# Patient Record
Sex: MALE | Race: WHITE | NOT HISPANIC OR LATINO | URBAN - METROPOLITAN AREA
[De-identification: names, ages, dates, MRNs, and addresses within clinical notes are randomized per-mention and may not be internally consistent; named-entity substitution may affect disease eponyms.]

---

## 2017-02-23 ENCOUNTER — OUTPATIENT (OUTPATIENT)
Dept: OUTPATIENT SERVICES | Facility: HOSPITAL | Age: 67
LOS: 1 days | End: 2017-02-23

## 2017-03-08 ENCOUNTER — OUTPATIENT (OUTPATIENT)
Dept: OUTPATIENT SERVICES | Facility: HOSPITAL | Age: 67
LOS: 1 days | End: 2017-03-08

## 2019-04-08 ENCOUNTER — OUTPATIENT (OUTPATIENT)
Dept: OUTPATIENT SERVICES | Facility: HOSPITAL | Age: 69
LOS: 1 days | End: 2019-04-08

## 2019-05-31 ENCOUNTER — OUTPATIENT (OUTPATIENT)
Dept: OUTPATIENT SERVICES | Facility: HOSPITAL | Age: 69
LOS: 1 days | End: 2019-05-31

## 2019-11-19 ENCOUNTER — OUTPATIENT (OUTPATIENT)
Dept: OUTPATIENT SERVICES | Facility: HOSPITAL | Age: 69
LOS: 1 days | End: 2019-11-19

## 2020-11-20 ENCOUNTER — OUTPATIENT (OUTPATIENT)
Dept: OUTPATIENT SERVICES | Facility: HOSPITAL | Age: 70
LOS: 1 days | End: 2020-11-20

## 2021-03-08 ENCOUNTER — OUTPATIENT (OUTPATIENT)
Dept: OUTPATIENT SERVICES | Facility: HOSPITAL | Age: 71
LOS: 1 days | End: 2021-03-08

## 2022-03-08 ENCOUNTER — OUTPATIENT (OUTPATIENT)
Dept: OUTPATIENT SERVICES | Facility: HOSPITAL | Age: 72
LOS: 1 days | End: 2022-03-08
Payer: MEDICARE

## 2022-03-08 DIAGNOSIS — R10.9 UNSPECIFIED ABDOMINAL PAIN: ICD-10-CM

## 2022-03-08 DIAGNOSIS — E04.1 NONTOXIC SINGLE THYROID NODULE: ICD-10-CM

## 2022-03-08 PROCEDURE — 76700 US EXAM ABDOM COMPLETE: CPT | Mod: 26

## 2022-03-08 PROCEDURE — 76536 US EXAM OF HEAD AND NECK: CPT | Mod: 26

## 2022-03-16 ENCOUNTER — APPOINTMENT (OUTPATIENT)
Dept: ENDOCRINOLOGY | Facility: CLINIC | Age: 72
End: 2022-03-16
Payer: MEDICARE

## 2022-03-16 ENCOUNTER — RESULT REVIEW (OUTPATIENT)
Age: 72
End: 2022-03-16

## 2022-03-16 VITALS
DIASTOLIC BLOOD PRESSURE: 68 MMHG | HEART RATE: 104 BPM | WEIGHT: 149 LBS | HEIGHT: 70 IN | SYSTOLIC BLOOD PRESSURE: 112 MMHG | BODY MASS INDEX: 21.33 KG/M2

## 2022-03-16 DIAGNOSIS — R00.0 TACHYCARDIA, UNSPECIFIED: ICD-10-CM

## 2022-03-16 DIAGNOSIS — Z83.49 FAMILY HISTORY OF OTHER ENDOCRINE, NUTRITIONAL AND METABOLIC DISEASES: ICD-10-CM

## 2022-03-16 DIAGNOSIS — E05.90 THYROTOXICOSIS, UNSPECIFIED W/OUT THYROTOXIC CRISIS OR STORM: ICD-10-CM

## 2022-03-16 PROCEDURE — 99204 OFFICE O/P NEW MOD 45 MIN: CPT

## 2022-03-16 RX ORDER — METOPROLOL SUCCINATE 25 MG/1
25 TABLET, EXTENDED RELEASE ORAL
Refills: 0 | Status: ACTIVE | COMMUNITY

## 2022-03-16 RX ORDER — FLUTICASONE PROPIONATE 50 UG/1
50 SPRAY, METERED NASAL
Refills: 0 | Status: ACTIVE | COMMUNITY

## 2022-03-16 NOTE — ASSESSMENT
[FreeTextEntry1] : 72 y.o. male Hx of A-fib (Dx 3/2021) on AC with Eliquis and rate controlled with BB, referred from PCP for evaluation of abnormal TFTs, (TSH 0.007, FT4 1.94). Patient c/o recent wt loss 5 Lb and palpitations. Thyroid US significant for:\par Right lobe:\par 1.3 x 1.7 x 1.1 cm - Mid/Low pole Hyperechoic nodule\par Left lobe\par 2.1 x 1.5 x 1.4 cm Mid pole HYperechoic nodule \par 2.0 x 1.1 x 1.3 cm  Lateral Mid Hyperechoic nodule\par \par # Hyperthyroidism in the settings of wt loss and underlying paroxysmal a-fib\par DD include Grave's disease vs Toxic MNG vs. Toxic thyroid nodule\par Unlikely thyroiditis\par Will obtain CMP with CBC prior to initiate Tx with Methimazole - risks, side effects and benefits of the medication were explained to the patient \par TSI and TRAB ordered\par \par # MNG\par Will obtain thyroid uptake scan to r/o functioning nodules.\par If cold nodules will consider FNA\par \par # Mild tachycardia in the settings of paroxysmal a-fib\par Likely secondary to hyperthyroidism\par Should improve with initiation of Methimazole \par Continue current regimen with Metoprolol 25 mg XR daily\par Patient will discuss dose adjustment with his cardiologist. \par \par Will call the patient with lab results and prescription of Methimazole\par F/u in clinic in 4 weeks with repeated blood work prior the visit. \par

## 2022-03-16 NOTE — HISTORY OF PRESENT ILLNESS
[FreeTextEntry1] : 72 y.o. male Hx of A-fib (Dx 3/2021) on AC with Eliquis and rate controlled with BB, referred from PCP for evaluation of abnormal TFTs, (TSH 0.007, FT4 1.94). Patient denies previous Hx of thyroid disease or taking thyroid medications. No Hx of  IV contrast enhanced images or taking Amiodarone. He is generally very active physically but for the last few months has been experiencing more palpitations and lost about 5Lb. Denies compressive symptoms, neck pain, sore throat or dysphagia. Denies eye pain, soreness or pressure. Family Hx significant for 3 sisters with hypothyroidism.

## 2022-03-16 NOTE — REVIEW OF SYSTEMS
[Recent Weight Loss (___ Lbs)] : recent weight loss: [unfilled] lbs [Palpitations] : palpitations [As Noted in HPI] : as noted in HPI [Negative] : Heme/Lymph

## 2022-03-17 ENCOUNTER — EMERGENCY (EMERGENCY)
Facility: HOSPITAL | Age: 72
LOS: 1 days | Discharge: HOME | End: 2022-03-17
Admitting: EMERGENCY MEDICINE
Payer: MEDICARE

## 2022-03-17 PROCEDURE — 93010 ELECTROCARDIOGRAM REPORT: CPT

## 2022-03-17 PROCEDURE — 99284 EMERGENCY DEPT VISIT MOD MDM: CPT

## 2022-03-18 DIAGNOSIS — Z79.01 LONG TERM (CURRENT) USE OF ANTICOAGULANTS: ICD-10-CM

## 2022-03-18 DIAGNOSIS — I10 ESSENTIAL (PRIMARY) HYPERTENSION: ICD-10-CM

## 2022-03-18 DIAGNOSIS — R55 SYNCOPE AND COLLAPSE: ICD-10-CM

## 2022-03-18 DIAGNOSIS — I48.92 UNSPECIFIED ATRIAL FLUTTER: ICD-10-CM

## 2022-03-24 ENCOUNTER — OUTPATIENT (OUTPATIENT)
Dept: OUTPATIENT SERVICES | Facility: HOSPITAL | Age: 72
LOS: 1 days | End: 2022-03-24
Payer: MEDICARE

## 2022-03-24 DIAGNOSIS — E04.9 NONTOXIC GOITER, UNSPECIFIED: ICD-10-CM

## 2022-03-25 PROCEDURE — 78014 THYROID IMAGING W/BLOOD FLOW: CPT | Mod: 26

## 2022-03-29 ENCOUNTER — NON-APPOINTMENT (OUTPATIENT)
Age: 72
End: 2022-03-29

## 2022-04-13 ENCOUNTER — APPOINTMENT (OUTPATIENT)
Dept: ENDOCRINOLOGY | Facility: CLINIC | Age: 72
End: 2022-04-13

## 2022-07-11 ENCOUNTER — EMERGENCY (EMERGENCY)
Facility: HOSPITAL | Age: 72
LOS: 1 days | Discharge: HOME | End: 2022-07-11
Admitting: EMERGENCY MEDICINE
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
## 2022-07-11 DIAGNOSIS — S60.351A SUPERFICIAL FOREIGN BODY OF RIGHT THUMB, INITIAL ENCOUNTER: ICD-10-CM
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
## 2022-07-11 DIAGNOSIS — Z79.01 LONG TERM (CURRENT) USE OF ANTICOAGULANTS: ICD-10-CM
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
## 2022-07-11 DIAGNOSIS — I10 ESSENTIAL (PRIMARY) HYPERTENSION: ICD-10-CM

## 2022-07-11 PROCEDURE — 99283 EMERGENCY DEPT VISIT LOW MDM: CPT | Mod: FS

## 2022-09-29 ENCOUNTER — NON-APPOINTMENT (OUTPATIENT)
Age: 72
End: 2022-09-29

## 2022-10-17 ENCOUNTER — NON-APPOINTMENT (OUTPATIENT)
Age: 72
End: 2022-10-17

## 2022-10-17 DIAGNOSIS — J32.9 CHRONIC SINUSITIS, UNSPECIFIED: ICD-10-CM

## 2022-10-17 DIAGNOSIS — I48.92 UNSPECIFIED ATRIAL FLUTTER: ICD-10-CM

## 2022-10-17 DIAGNOSIS — Z83.3 FAMILY HISTORY OF DIABETES MELLITUS: ICD-10-CM

## 2022-10-17 DIAGNOSIS — R51.9 HEADACHE, UNSPECIFIED: ICD-10-CM

## 2022-10-17 RX ORDER — PNV NO.95/FERROUS FUM/FOLIC AC 28MG-0.8MG
TABLET ORAL
Refills: 0 | Status: ACTIVE | COMMUNITY

## 2023-01-05 ENCOUNTER — RX ONLY (RX ONLY)
Age: 73
End: 2023-01-05

## 2023-01-05 ENCOUNTER — OFFICE (OUTPATIENT)
Dept: URBAN - METROPOLITAN AREA CLINIC 38 | Facility: CLINIC | Age: 73
Setting detail: OPHTHALMOLOGY
End: 2023-01-05
Payer: MEDICARE

## 2023-01-05 DIAGNOSIS — H02.403: ICD-10-CM

## 2023-01-05 DIAGNOSIS — Z96.1: ICD-10-CM

## 2023-01-05 DIAGNOSIS — H02.015: ICD-10-CM

## 2023-01-05 DIAGNOSIS — H02.012: ICD-10-CM

## 2023-01-05 PROCEDURE — 92014 COMPRE OPH EXAM EST PT 1/>: CPT | Performed by: OPHTHALMOLOGY

## 2023-01-05 ASSESSMENT — REFRACTION_CURRENTRX
OD_AXIS: 121
OS_SPHERE: +0.25
OD_OVR_VA: 20/
OD_AXIS: 128
OS_CYLINDER: -3.00
OD_VPRISM_DIRECTION: SV
OD_VPRISM_DIRECTION: SV
OD_OVR_VA: 20/
OS_SPHERE: +3.00
OD_CYLINDER: -3.00
OS_AXIS: 078
OD_SPHERE: +4.25
OS_OVR_VA: 20/
OS_OVR_VA: 20/
OS_CYLINDER: -3.00
OS_AXIS: 078
OD_CYLINDER: -3.00
OS_VPRISM_DIRECTION: SV
OS_VPRISM_DIRECTION: SV
OD_SPHERE: +1.75

## 2023-01-05 ASSESSMENT — REFRACTION_MANIFEST
OS_VPRISM: BD
OS_AXIS: 70
OS_ADD: +2.75
OD_VA2: 20/20(J1+)
OD_SPHERE: +1.75
OU_VA: 20/20-1
OD_ADD: +2.75
OS_VA1: 20/30
OD_VPRISM: BU
OD_AXIS: 120
OD_VA1: 20/20-2
OD_CYLINDER: -3.25
OS_SPHERE: +0.25
OS_VA2: 20/20(J1+)
OS_CYLINDER: -3.00

## 2023-01-05 ASSESSMENT — VISUAL ACUITY
OS_BCVA: 20/20-1
OD_BCVA: 20/25

## 2023-01-05 ASSESSMENT — KERATOMETRY
OD_AXISANGLE_DEGREES: 39
OD_K2POWER_DIOPTERS: 45.75
OD_K1POWER_DIOPTERS: 43.75
OS_K2POWER_DIOPTERS: 46.00
METHOD_AUTO_MANUAL: AUTO
OS_AXISANGLE_DEGREES: 152
OS_K1POWER_DIOPTERS: 44.00

## 2023-01-05 ASSESSMENT — SPHEQUIV_DERIVED
OD_SPHEQUIV: 0
OS_SPHEQUIV: -1.25
OD_SPHEQUIV: 0.125

## 2023-01-05 ASSESSMENT — LID POSITION - COMMENTS
OD_COMMENTS: 1MM
OS_COMMENTS: 1MM

## 2023-01-05 ASSESSMENT — AXIALLENGTH_DERIVED
OS_AL: 23.5281
OD_AL: 23.1415
OD_AL: 23.0948

## 2023-01-05 ASSESSMENT — REFRACTION_AUTOREFRACTION
OS_AXIS: 69
OD_AXIS: 123
OD_CYLINDER: -3.50
OD_SPHERE: +1.75
OS_CYLINDER: -3.00
OS_SPHERE: PLANO

## 2023-01-05 ASSESSMENT — LID POSITION - PTOSIS
OS_PTOSIS: LUL
OD_PTOSIS: RUL

## 2023-01-05 ASSESSMENT — CONFRONTATIONAL VISUAL FIELD TEST (CVF)
OD_FINDINGS: FULL
OS_FINDINGS: FULL

## 2023-01-05 ASSESSMENT — TONOMETRY
OD_IOP_MMHG: 14
OS_IOP_MMHG: 14

## 2023-01-24 ENCOUNTER — RESULT REVIEW (OUTPATIENT)
Age: 73
End: 2023-01-24

## 2023-02-13 ENCOUNTER — APPOINTMENT (OUTPATIENT)
Dept: ENDOCRINOLOGY | Facility: CLINIC | Age: 73
End: 2023-02-13
Payer: MEDICARE

## 2023-02-13 VITALS
TEMPERATURE: 98.2 F | BODY MASS INDEX: 22.19 KG/M2 | DIASTOLIC BLOOD PRESSURE: 80 MMHG | HEIGHT: 70 IN | WEIGHT: 155 LBS | SYSTOLIC BLOOD PRESSURE: 122 MMHG | OXYGEN SATURATION: 99 % | HEART RATE: 85 BPM

## 2023-02-13 PROCEDURE — 99213 OFFICE O/P EST LOW 20 MIN: CPT

## 2023-02-13 RX ORDER — AMITRIPTYLINE HYDROCHLORIDE 75 MG/1
TABLET, FILM COATED ORAL
Refills: 0 | Status: DISCONTINUED | COMMUNITY
End: 2023-02-13

## 2023-02-13 NOTE — HISTORY OF PRESENT ILLNESS
[FreeTextEntry1] : This is a 73-year-old white male with a past medical history of a multinodular goiter high thyroidism secondary to acute thyroiditis after using amiodarone.  He also he has has a history of cardiac arrhythmias.  Recently he claimed that he had an episode of a palpitation for which she required radiofrequency ablation and implantation of a cardiac monitor.  He claims that his appetite is good and he has gained weight.  He denies neck pain difficulty swallowing or hoarseness.  He has not noticed any excessive heat intolerance nausea vomiting or diarrhea.  His energy level is well-preserved.  Current medications include atorvastatin, Eliquis, metoprolol, Flonase,.

## 2023-02-13 NOTE — PHYSICAL EXAM
[Alert] : alert [Well Nourished] : well nourished [No Acute Distress] : no acute distress [Well Developed] : well developed [Normal Sclera/Conjunctiva] : normal sclera/conjunctiva [EOMI] : extra ocular movement intact [No Proptosis] : no proptosis [Normal Oropharynx] : the oropharynx was normal [No Neck Mass] : no neck mass was observed [Thyroid Not Enlarged] : the thyroid was not enlarged [No Thyroid Nodules] : no palpable thyroid nodules [No Respiratory Distress] : no respiratory distress [No Accessory Muscle Use] : no accessory muscle use [Clear to Auscultation] : lungs were clear to auscultation bilaterally [Normal S1, S2] : normal S1 and S2 [Normal Rate] : heart rate was normal [Regular Rhythm] : with a regular rhythm [No Edema] : no peripheral edema [Pedal Pulses Normal] : the pedal pulses are present [Normal Bowel Sounds] : normal bowel sounds [Not Tender] : non-tender [Not Distended] : not distended [Soft] : abdomen soft [Normal Anterior Cervical Nodes] : no anterior cervical lymphadenopathy [Normal Posterior Cervical Nodes] : no posterior cervical lymphadenopathy [No Spinal Tenderness] : no spinal tenderness [Spine Straight] : spine straight [No Stigmata of Cushings Syndrome] : no stigmata of Cushings Syndrome [Normal Gait] : normal gait [Normal Strength/Tone] : muscle strength and tone were normal [No Rash] : no rash [Acanthosis Nigricans] : no acanthosis nigricans [Normal Reflexes] : deep tendon reflexes were 2+ and symmetric [No Tremors] : no tremors [Oriented x3] : oriented to person, place, and time

## 2023-02-13 NOTE — ASSESSMENT
[FreeTextEntry1] : Middle-aged white male who has a past medical history of a multinodular goiter.  Currently stable.  His last TSH level is 1.92.  He recently had a sonogram done in the hospital which showed that there was a new nodule on the left isthmus area measuring 3 x 6 x 3 mm and it falls in the category of TR 3.  The remaining 2 nodules on the left lower pole are unchanged.  Clinical impression is that this is a nontoxic multinodular goiter with most likely benign nodules which have remained stable and unchanged.  Patient currently does not require any thyroid medication.  Recommendation\par 1.  I have advised the patient to repeat a blood test for the thyroid functions in approximately 6 months time.\par 2.  If the blood tests are normal then he would not require any intervention but we will repeat a sonogram of the thyroid in 1 years time.  The plan was discussed in detail with the patient thank you

## 2023-02-22 ENCOUNTER — APPOINTMENT (OUTPATIENT)
Dept: ULTRASOUND IMAGING | Facility: CLINIC | Age: 73
End: 2023-02-22
Payer: MEDICARE

## 2023-02-22 PROCEDURE — 93880 EXTRACRANIAL BILAT STUDY: CPT

## 2023-09-01 ENCOUNTER — APPOINTMENT (OUTPATIENT)
Dept: ENDOCRINOLOGY | Facility: CLINIC | Age: 73
End: 2023-09-01
Payer: MEDICARE

## 2023-09-01 VITALS
DIASTOLIC BLOOD PRESSURE: 70 MMHG | HEIGHT: 70 IN | BODY MASS INDEX: 22.19 KG/M2 | TEMPERATURE: 98 F | WEIGHT: 155 LBS | OXYGEN SATURATION: 98 % | RESPIRATION RATE: 14 BRPM | HEART RATE: 88 BPM | SYSTOLIC BLOOD PRESSURE: 108 MMHG

## 2023-09-01 PROCEDURE — 99213 OFFICE O/P EST LOW 20 MIN: CPT

## 2023-09-01 NOTE — REASON FOR VISIT
[Follow - Up] : a follow-up visit [Hyperthyroidism] : hyperthyroidism [Thyroid nodule/ MNG] : thyroid nodule/ MNG Rinvoq Counseling: I discussed with the patient the risks of Rinvoq therapy including but not limited to upper respiratory tract infections, shingles, cold sores, bronchitis, nausea, cough, fever, acne, and headache. Live vaccines should be avoided.  This medication has been linked to serious infections; higher rate of mortality; malignancy and lymphoproliferative disorders; major adverse cardiovascular events; thrombosis; thrombocytopenia, anemia, and neutropenia; lipid elevations; liver enzyme elevations; and gastrointestinal perforations.

## 2023-09-01 NOTE — HISTORY OF PRESENT ILLNESS
[FreeTextEntry1] : This is a pleasant 70-year-old white male with a past medical history of a multinodular goiter, history of acute thyroiditis after he was treated with amiodarone.  Patient currently is not on any thyroid medications.  He also has a history of a cardiac arrhythmias.  He recently had ablation done to the heart area for his cardiac arrhythmia with success.  He claims that his appetite is good and his weight has been stable he denies any palpitations chest pain difficulty swallowing or hoarseness.  His current medications also include atorvastatin, Eliquis, metoprolol, and Flonase.  Review of systems is otherwise negative.  He has not noticed any heat or cold intolerance or change in his bowel movements

## 2023-09-01 NOTE — ASSESSMENT
[FreeTextEntry1] : Middle-aged white gentleman who has a history of acute thyroiditis and was found to have a mild multinodular goiter.  His last sonogram which was performed earlier this year had not shown any changes except for a new left isthmus nodule measuring approximately 6 mm in size and was classified as TR 4.  Patient clinically is euthyroid.  He recently had a blood work done on August 22 which showed that the TSH is 2.45 and the free T4 is 1.0.  The above findings were discussed in detail with the patient.  Recommendation 1.  Patient at the present time is stable and does not require any intervention. 2.  We will repeat a sonogram of the thyroid in January of next year with a repeat blood test after which he will follow-up in the office. The plan was discussed in detail with the patient and all his questions were answered.  Thank you

## 2023-09-01 NOTE — PHYSICAL EXAM
[Alert] : alert [Well Nourished] : well nourished [No Acute Distress] : no acute distress [Well Developed] : well developed [Normal Sclera/Conjunctiva] : normal sclera/conjunctiva [EOMI] : extra ocular movement intact [No Proptosis] : no proptosis [Normal Oropharynx] : the oropharynx was normal [Thyroid Not Enlarged] : the thyroid was not enlarged [No Thyroid Nodules] : no palpable thyroid nodules [No Respiratory Distress] : no respiratory distress [No Accessory Muscle Use] : no accessory muscle use [Clear to Auscultation] : lungs were clear to auscultation bilaterally [Normal S1, S2] : normal S1 and S2 [Normal Rate] : heart rate was normal [Regular Rhythm] : with a regular rhythm [No Edema] : no peripheral edema [Pedal Pulses Normal] : the pedal pulses are present [Normal Bowel Sounds] : normal bowel sounds [Not Tender] : non-tender [Not Distended] : not distended [Soft] : abdomen soft [Normal Anterior Cervical Nodes] : no anterior cervical lymphadenopathy [Normal Posterior Cervical Nodes] : no posterior cervical lymphadenopathy [No Spinal Tenderness] : no spinal tenderness [Spine Straight] : spine straight [No Stigmata of Cushings Syndrome] : no stigmata of Cushings Syndrome [Normal Gait] : normal gait [Normal Strength/Tone] : muscle strength and tone were normal [No Rash] : no rash [Acanthosis Nigricans] : no acanthosis nigricans [Normal Reflexes] : deep tendon reflexes were 2+ and symmetric [No Tremors] : no tremors [Oriented x3] : oriented to person, place, and time [de-identified] : Normal size of the thyroid with a slightly irregular surface with the possibility of a small nodule on the left lower pole

## 2024-01-11 ENCOUNTER — OFFICE (OUTPATIENT)
Dept: URBAN - METROPOLITAN AREA CLINIC 38 | Facility: CLINIC | Age: 74
Setting detail: OPHTHALMOLOGY
End: 2024-01-11
Payer: MEDICARE

## 2024-01-11 DIAGNOSIS — H02.403: ICD-10-CM

## 2024-01-11 DIAGNOSIS — Z96.1: ICD-10-CM

## 2024-01-11 PROCEDURE — 92014 COMPRE OPH EXAM EST PT 1/>: CPT | Performed by: OPHTHALMOLOGY

## 2024-01-11 ASSESSMENT — REFRACTION_CURRENTRX
OS_VPRISM_DIRECTION: SV
OD_AXIS: 120
OD_OVR_VA: 20/
OD_CYLINDER: -3.00
OS_AXIS: 074
OD_CYLINDER: -3.00
OS_AXIS: 073
OS_OVR_VA: 20/
OD_AXIS: 110
OS_OVR_VA: 20/
OD_VPRISM_DIRECTION: SV
OS_CYLINDER: -3.25
OS_VPRISM_DIRECTION: SV
OS_CYLINDER: -3.00
OD_SPHERE: +1.75
OD_SPHERE: +4.25
OD_VPRISM_DIRECTION: SV
OS_SPHERE: +3.00
OD_OVR_VA: 20/
OS_SPHERE: +0.50

## 2024-01-11 ASSESSMENT — REFRACTION_MANIFEST
OD_VA2: 20/20(J1+)
OS_ADD: +2.50
OS_VPRISM: BD
OU_VA: 20/20-1
OD_ADD: +2.50
OS_VA2: 20/20(J1+)
OS_VA1: 20/30
OD_VA1: 20/20-2
OS_AXIS: 70
OD_AXIS: 125
OD_VPRISM: BU
OS_ADD: +2.75
OS_SPHERE: +0.25
OD_VPRISM: BU
OS_VA1: 20/30
OS_CYLINDER: -3.00
OS_AXIS: 080
OU_VA: 20/20-1
OD_SPHERE: +1.75
OD_VA2: 20/20(J1+)
OD_SPHERE: +1.75
OS_VPRISM: BD
OD_AXIS: 120
OS_CYLINDER: -3.00
OD_CYLINDER: -3.25
OS_VA2: 20/20(J1+)
OD_VA1: 20/20-2
OS_SPHERE: +0.25
OD_ADD: +2.75
OD_CYLINDER: -3.00

## 2024-01-11 ASSESSMENT — CONFRONTATIONAL VISUAL FIELD TEST (CVF)
OD_FINDINGS: FULL
OS_FINDINGS: FULL

## 2024-01-11 ASSESSMENT — LID POSITION - PTOSIS
OD_PTOSIS: RUL
OS_PTOSIS: LUL

## 2024-01-11 ASSESSMENT — SPHEQUIV_DERIVED
OS_SPHEQUIV: -1.25
OD_SPHEQUIV: 0.375
OD_SPHEQUIV: 0.25
OS_SPHEQUIV: -1.25
OS_SPHEQUIV: -1.625
OD_SPHEQUIV: 0.125

## 2024-01-11 ASSESSMENT — REFRACTION_AUTOREFRACTION
OS_AXIS: 081
OS_SPHERE: -0.25
OD_CYLINDER: -3.75
OD_SPHERE: +2.25
OS_CYLINDER: -2.75
OD_AXIS: 126

## 2024-01-11 ASSESSMENT — LID POSITION - COMMENTS
OS_COMMENTS: 1MM
OD_COMMENTS: 1MM

## 2024-01-13 ENCOUNTER — APPOINTMENT (OUTPATIENT)
Dept: MRI IMAGING | Facility: CLINIC | Age: 74
End: 2024-01-13
Payer: MEDICARE

## 2024-01-13 PROCEDURE — 73718 MRI LOWER EXTREMITY W/O DYE: CPT | Mod: LT,MH

## 2024-01-26 ENCOUNTER — NON-APPOINTMENT (OUTPATIENT)
Age: 74
End: 2024-01-26

## 2024-02-06 ENCOUNTER — NON-APPOINTMENT (OUTPATIENT)
Age: 74
End: 2024-02-06

## 2024-03-11 ENCOUNTER — APPOINTMENT (OUTPATIENT)
Dept: ENDOCRINOLOGY | Facility: CLINIC | Age: 74
End: 2024-03-11
Payer: MEDICARE

## 2024-03-11 VITALS
RESPIRATION RATE: 14 BRPM | HEART RATE: 85 BPM | OXYGEN SATURATION: 97 % | TEMPERATURE: 98.2 F | SYSTOLIC BLOOD PRESSURE: 110 MMHG | DIASTOLIC BLOOD PRESSURE: 70 MMHG | BODY MASS INDEX: 24.34 KG/M2 | HEIGHT: 70 IN | WEIGHT: 170 LBS

## 2024-03-11 DIAGNOSIS — E04.2 NONTOXIC MULTINODULAR GOITER: ICD-10-CM

## 2024-03-11 PROCEDURE — G2211 COMPLEX E/M VISIT ADD ON: CPT

## 2024-03-11 PROCEDURE — 99213 OFFICE O/P EST LOW 20 MIN: CPT

## 2024-03-11 NOTE — PHYSICAL EXAM
[Alert] : alert [Well Nourished] : well nourished [No Acute Distress] : no acute distress [Well Developed] : well developed [Normal Sclera/Conjunctiva] : normal sclera/conjunctiva [EOMI] : extra ocular movement intact [No Proptosis] : no proptosis [Normal Oropharynx] : the oropharynx was normal [Thyroid Not Enlarged] : the thyroid was not enlarged [No Respiratory Distress] : no respiratory distress [No Accessory Muscle Use] : no accessory muscle use [Clear to Auscultation] : lungs were clear to auscultation bilaterally [Normal S1, S2] : normal S1 and S2 [Normal Rate] : heart rate was normal [Regular Rhythm] : with a regular rhythm [No Edema] : no peripheral edema [Pedal Pulses Normal] : the pedal pulses are present [Normal Bowel Sounds] : normal bowel sounds [Not Tender] : non-tender [Not Distended] : not distended [Soft] : abdomen soft [Normal Posterior Cervical Nodes] : no posterior cervical lymphadenopathy [Normal Anterior Cervical Nodes] : no anterior cervical lymphadenopathy [No Spinal Tenderness] : no spinal tenderness [No Stigmata of Cushings Syndrome] : no stigmata of Cushings Syndrome [Spine Straight] : spine straight [Normal Gait] : normal gait [No Rash] : no rash [Normal Strength/Tone] : muscle strength and tone were normal [Acanthosis Nigricans] : no acanthosis nigricans [Normal Reflexes] : deep tendon reflexes were 2+ and symmetric [No Tremors] : no tremors [de-identified] : Normal-sized thyroid gland was small palpable bilateral thyroid nodules in the midpole area [Oriented x3] : oriented to person, place, and time

## 2024-03-11 NOTE — ASSESSMENT
[FreeTextEntry1] : Middle-aged white male who was diagnosed to have acute thyroiditis after being treated with amiodarone.  He also had sonogram performed last year which showed that there were multiple thyroid nodules on both sides of the thyroid.  His recent blood test from February 14 of this year showed a TSH of 1.93 other routine blood chemistries were normal.  He also underwent a sonogram of the thyroid which showed 2 thyroid nodules all less than 2.5 cm in size and being classified as TI-RADS 3.  Patient clinically is euthyroid with dense of any increase in the size of the thyroid nodules.  Patient clinically is stable without any change in the size of the thyroid nodules and clinically he is euthyroid.  Recommendation 1.  At this current stage I will not advise any intervention at this time.  He does not require any medication since he is stable and also fine-needle aspiration biopsy of the thyroid nodules will not be warranted at the time due to fact that the size of the nodules is less then 2.5 cm and they are all isoechoic ,.are in the category TIRADS 3. 2 patient was explained this in detail and he is agreeable to the option of clinical observation.  He will require a repeat blood test in 6 months and a sonogram of the thyroid in 1 years time..  Thank you

## 2024-03-20 ENCOUNTER — OUTPATIENT (OUTPATIENT)
Dept: OUTPATIENT SERVICES | Facility: HOSPITAL | Age: 74
LOS: 1 days | End: 2024-03-20
Payer: MEDICARE

## 2024-03-20 ENCOUNTER — APPOINTMENT (OUTPATIENT)
Dept: MRI IMAGING | Facility: CLINIC | Age: 74
End: 2024-03-20
Payer: MEDICARE

## 2024-03-20 DIAGNOSIS — Z00.8 ENCOUNTER FOR OTHER GENERAL EXAMINATION: ICD-10-CM

## 2024-03-20 PROCEDURE — A9585: CPT

## 2024-03-20 PROCEDURE — 72197 MRI PELVIS W/O & W/DYE: CPT | Mod: 26,MH

## 2024-03-20 PROCEDURE — 76498 UNLISTED MR PROCEDURE: CPT

## 2024-03-20 PROCEDURE — 72197 MRI PELVIS W/O & W/DYE: CPT | Mod: MH

## 2024-03-20 PROCEDURE — 76498P: CUSTOM | Mod: 26,MH

## 2024-04-05 ENCOUNTER — NON-APPOINTMENT (OUTPATIENT)
Age: 74
End: 2024-04-05

## 2024-09-16 ENCOUNTER — APPOINTMENT (OUTPATIENT)
Dept: ENDOCRINOLOGY | Facility: CLINIC | Age: 74
End: 2024-09-16
Payer: MEDICARE

## 2024-09-16 VITALS
SYSTOLIC BLOOD PRESSURE: 108 MMHG | DIASTOLIC BLOOD PRESSURE: 72 MMHG | HEART RATE: 84 BPM | BODY MASS INDEX: 23.19 KG/M2 | HEIGHT: 70 IN | WEIGHT: 162 LBS | OXYGEN SATURATION: 98 % | TEMPERATURE: 97.6 F

## 2024-09-16 DIAGNOSIS — F41.9 ANXIETY DISORDER, UNSPECIFIED: ICD-10-CM

## 2024-09-16 DIAGNOSIS — E04.2 NONTOXIC MULTINODULAR GOITER: ICD-10-CM

## 2024-09-16 PROCEDURE — 99213 OFFICE O/P EST LOW 20 MIN: CPT

## 2024-09-16 RX ORDER — PSYLLIUM HUSK 0.4 G
CAPSULE ORAL DAILY
Refills: 0 | Status: ACTIVE | COMMUNITY

## 2024-09-16 RX ORDER — LORAZEPAM 1 MG/1
1 TABLET ORAL DAILY
Refills: 0 | Status: ACTIVE | COMMUNITY

## 2024-09-16 RX ORDER — ASPIRIN 81 MG/1
81 TABLET ORAL DAILY
Refills: 0 | Status: ACTIVE | COMMUNITY

## 2024-09-16 RX ORDER — TURMERIC ROOT EXTRACT 500 MG
TABLET ORAL DAILY
Refills: 0 | Status: ACTIVE | COMMUNITY

## 2024-09-16 RX ORDER — FAMOTIDINE 40 MG/1
TABLET, FILM COATED ORAL
Refills: 0 | Status: ACTIVE | COMMUNITY

## 2024-09-16 NOTE — ASSESSMENT
[FreeTextEntry1] : Middle-aged white male who has a past medical history of acute thyroiditis, status posttreatment with amiodarone was incidentally found to have bilateral multiple thyroid nodules.  The last sonogram from February of this year was essentially without any change with 2 nodules on the left side measuring more than 2 cm in size and 1 on the right side.  All the nodules fall in the category TI-RADS 3.  Also the patient recently had a blood test performed on the 15th of this month which showed that the TSH is 1.86 and the free T4 is 1.1.  Recommendation 1.  Since the patient is clinically euthyroid he does not require any active intervention with levothyroxine at the present time 2.  We will obtain a follow-up sonogram of the thyroid in February of next year in order to monitor any significant change in the size of the thyroid nodules. 3.  Patient will also have a blood test performed at the same time and he will then follow-up in the office in 6 months.

## 2024-09-16 NOTE — HISTORY OF PRESENT ILLNESS
[FreeTextEntry1] : 74-year-old male with a medical HX of hyperthyroidism present for a routine follow up visit. Patient is currently taking medications of Metroprolol 25mg, Perphenazine Amitriptyline 2-25mg, aspirin 81mg, VitB 12, pepcid 25mg. Patient has an ablation in the year of 2023 and is compliant with cardiology. He has no significant complaints of chest pain, SOB, abdominal pain, nausea or vomiting. Patient is no longer taking Eliquis, since the ablation last year cardiology d/c the medication.  Patient has a history of being treated with amiodarone in the past after which she had developed an acute episode of thyroiditis.  His last sonogram of the thyroid was performed February of this year which had shown thyroid nodules on the left side more than 2 cm but they were in the class TI-RADS 3

## 2024-09-16 NOTE — REVIEW OF SYSTEMS
[Recent Weight Loss (___ Lbs)] : recent weight loss: [unfilled] lbs [Dysphagia] : no dysphagia [Dysphonia] : no dysphonia [Negative] : Heme/Lymph

## 2024-09-16 NOTE — PHYSICAL EXAM
[Alert] : alert [Well Nourished] : well nourished [No Acute Distress] : no acute distress [Well Developed] : well developed [Normal Sclera/Conjunctiva] : normal sclera/conjunctiva [EOMI] : extra ocular movement intact [No Proptosis] : no proptosis [Normal Oropharynx] : the oropharynx was normal [Thyroid Not Enlarged] : the thyroid was not enlarged [No Thyroid Nodules] : no palpable thyroid nodules [No Respiratory Distress] : no respiratory distress [No Accessory Muscle Use] : no accessory muscle use [Clear to Auscultation] : lungs were clear to auscultation bilaterally [Normal S1, S2] : normal S1 and S2 [Normal Rate] : heart rate was normal [Regular Rhythm] : with a regular rhythm [No Edema] : no peripheral edema [Pedal Pulses Normal] : the pedal pulses are present [Normal Bowel Sounds] : normal bowel sounds [Not Tender] : non-tender [Not Distended] : not distended [Soft] : abdomen soft [Normal Anterior Cervical Nodes] : no anterior cervical lymphadenopathy [Normal Posterior Cervical Nodes] : no posterior cervical lymphadenopathy [No Spinal Tenderness] : no spinal tenderness [Spine Straight] : spine straight [No Stigmata of Cushings Syndrome] : no stigmata of Cushings Syndrome [Normal Gait] : normal gait [Normal Strength/Tone] : muscle strength and tone were normal [No Rash] : no rash [Acanthosis Nigricans] : no acanthosis nigricans [Normal Reflexes] : deep tendon reflexes were 2+ and symmetric [No Tremors] : no tremors [Oriented x3] : oriented to person, place, and time [de-identified] : Slightly prominent thyroid with the possibility of a nodule on the left lower pole smooth and nontender

## 2025-01-13 ENCOUNTER — OFFICE (OUTPATIENT)
Dept: URBAN - METROPOLITAN AREA CLINIC 38 | Facility: CLINIC | Age: 75
Setting detail: OPHTHALMOLOGY
End: 2025-01-13
Payer: MEDICARE

## 2025-01-13 DIAGNOSIS — H52.4: ICD-10-CM

## 2025-01-13 DIAGNOSIS — H02.403: ICD-10-CM

## 2025-01-13 PROBLEM — H50.22 HYPERTROPIA LEFT EYE: Status: ACTIVE | Noted: 2025-01-13

## 2025-01-13 PROCEDURE — 92014 COMPRE OPH EXAM EST PT 1/>: CPT

## 2025-01-13 PROCEDURE — 92015 DETERMINE REFRACTIVE STATE: CPT

## 2025-01-13 ASSESSMENT — REFRACTION_MANIFEST
OS_VA1: 20/25+2
OU_VA: 20/20-2
OD_VA2: 20/20(J1+)
OS_VA2: 20/20(J1+)
OD_SPHERE: +1.50
OD_CYLINDER: -3.25
OS_VPRISM: BD
OS_CYLINDER: -3.00
OD_VPRISM: BU
OS_ADD: +2.75
OD_VA2: 20/20(J1+)
OS_VPRISM: BD
OD_CYLINDER: -3.25
OS_SPHERE: +0.50
OS_ADD: +2.50
OS_AXIS: 075
OS_VA1: 20/25+
OD_AXIS: 125
OS_SPHERE: PLANO
OS_VA2: 20/20(J1+)
OS_CYLINDER: -3.50
OD_VA1: 20/20-2
OD_VA1: 20/20-
OD_SPHERE: +1.75
OS_AXIS: 080
OD_AXIS: 120
OD_VPRISM: BU
OU_VA: 20/20-
OD_ADD: +2.50
OD_ADD: +2.75

## 2025-01-13 ASSESSMENT — LID POSITION - COMMENTS
OD_COMMENTS: 1MM
OS_COMMENTS: 1MM

## 2025-01-13 ASSESSMENT — LID POSITION - PTOSIS
OD_PTOSIS: RUL
OS_PTOSIS: LUL

## 2025-01-13 ASSESSMENT — REFRACTION_AUTOREFRACTION
OS_CYLINDER: -2.50
OS_SPHERE: -0.75
OS_AXIS: 076
OD_AXIS: 122
OD_SPHERE: +1.75
OD_CYLINDER: -3.25

## 2025-01-13 ASSESSMENT — REFRACTION_CURRENTRX
OS_VPRISM_DIRECTION: SV
OD_AXIS: 125
OD_SPHERE: +4.25
OS_CYLINDER: -3.00
OD_CYLINDER: -3.25
OD_OVR_VA: 20/
OD_AXIS: 125
OD_SPHERE: +1.75
OD_OVR_VA: 20/
OD_VPRISM_DIRECTION: SV
OS_AXIS: 069
OS_CYLINDER: -3.00
OS_SPHERE: +0.25
OS_OVR_VA: 20/
OS_SPHERE: +3.00
OD_VPRISM_DIRECTION: SV
OS_OVR_VA: 20/
OS_AXIS: 069
OS_VPRISM_DIRECTION: SV
OD_CYLINDER: -3.00

## 2025-01-13 ASSESSMENT — KERATOMETRY
OD_K1POWER_DIOPTERS: 43.75
OS_K2POWER_DIOPTERS: 45.75
OS_AXISANGLE_DEGREES: 164
OS_K1POWER_DIOPTERS: 43.50
OD_AXISANGLE_DEGREES: 042
METHOD_AUTO_MANUAL: AUTO
OD_K2POWER_DIOPTERS: 46.00

## 2025-01-13 ASSESSMENT — VISUAL ACUITY
OD_BCVA: 20/20-2
OS_BCVA: 20/20-2

## 2025-01-13 ASSESSMENT — TONOMETRY
OS_IOP_MMHG: 16
OD_IOP_MMHG: 16

## 2025-01-13 ASSESSMENT — CONFRONTATIONAL VISUAL FIELD TEST (CVF)
OD_FINDINGS: FULL
OS_FINDINGS: FULL

## 2025-02-06 ENCOUNTER — RESULT REVIEW (OUTPATIENT)
Age: 75
End: 2025-02-06

## 2025-02-13 NOTE — HISTORY OF PRESENT ILLNESS
Already spoke with patient.----- Message from Felecia sent at 2/13/2025  2:49 PM CST -----  Type:  Patient Returning Call    Who Called:Mr Ruelas   Who Left Message for Patient:Ms Swanson   Does the patient know what this is regarding?:yes  Would the patient rather a call back or a response via [facility]? Call   Best Call Back Number: [phone]  Additional Information: he states he was calling you back please call states he keep missing your call  
[FreeTextEntry1] : Patient last seen 9/1/2023, most recent labs dated for 2/14/2024 from [facility]. Weight today 170. 74-year-old white male who has a past medical history of a multinodular goiter, history of acute thyroiditis after being treated with amiodarone, cardiac arrhythmia for which he was treated with radiofrequency ablation.  Patient currently denies any significant symptoms.  He is taking Flonase inhaler, metoprolol 25 mg daily and vitamin B12.  Patient denies any significant symptoms he has not noticed any palpitations chest pain weight loss or difficulty swallowing.  He is appetite is good and energy level is well-preserved.  He denies any heat or cold intolerance.  Patient is upset at the present time due to the illness of his wife.

## 2025-03-04 ENCOUNTER — APPOINTMENT (OUTPATIENT)
Dept: ENDOCRINOLOGY | Facility: CLINIC | Age: 75
End: 2025-03-04
Payer: MEDICARE

## 2025-03-04 VITALS
OXYGEN SATURATION: 99 % | HEIGHT: 70 IN | SYSTOLIC BLOOD PRESSURE: 103 MMHG | WEIGHT: 160 LBS | HEART RATE: 72 BPM | TEMPERATURE: 98.3 F | DIASTOLIC BLOOD PRESSURE: 62 MMHG | BODY MASS INDEX: 22.9 KG/M2

## 2025-03-04 DIAGNOSIS — E04.2 NONTOXIC MULTINODULAR GOITER: ICD-10-CM

## 2025-03-04 DIAGNOSIS — R73.09 OTHER ABNORMAL GLUCOSE: ICD-10-CM

## 2025-03-04 LAB
HBA1C MFR BLD HPLC: 5.9
HBA1C MFR BLD HPLC: 6

## 2025-03-04 PROCEDURE — 99213 OFFICE O/P EST LOW 20 MIN: CPT

## 2025-06-27 ENCOUNTER — NON-APPOINTMENT (OUTPATIENT)
Age: 75
End: 2025-06-27

## 2025-08-12 PROBLEM — R91.1 LUNG NODULE: Status: ACTIVE | Noted: 2025-08-12

## 2025-08-14 ENCOUNTER — NON-APPOINTMENT (OUTPATIENT)
Age: 75
End: 2025-08-14

## 2025-08-14 ENCOUNTER — APPOINTMENT (OUTPATIENT)
Facility: CLINIC | Age: 75
End: 2025-08-14
Payer: MEDICARE

## 2025-08-14 VITALS
DIASTOLIC BLOOD PRESSURE: 70 MMHG | HEART RATE: 77 BPM | WEIGHT: 158 LBS | BODY MASS INDEX: 22.62 KG/M2 | SYSTOLIC BLOOD PRESSURE: 120 MMHG | HEIGHT: 70 IN | OXYGEN SATURATION: 99 %

## 2025-08-14 DIAGNOSIS — R91.1 SOLITARY PULMONARY NODULE: ICD-10-CM

## 2025-08-14 PROCEDURE — 99204 OFFICE O/P NEW MOD 45 MIN: CPT

## 2025-09-02 ENCOUNTER — APPOINTMENT (OUTPATIENT)
Dept: ENDOCRINOLOGY | Facility: CLINIC | Age: 75
End: 2025-09-02
Payer: MEDICARE

## 2025-09-02 DIAGNOSIS — R91.1 SOLITARY PULMONARY NODULE: ICD-10-CM

## 2025-09-02 DIAGNOSIS — E04.2 NONTOXIC MULTINODULAR GOITER: ICD-10-CM

## 2025-09-02 DIAGNOSIS — R73.09 OTHER ABNORMAL GLUCOSE: ICD-10-CM

## 2025-09-02 PROCEDURE — 99213 OFFICE O/P EST LOW 20 MIN: CPT

## 2025-09-03 ENCOUNTER — RESULT REVIEW (OUTPATIENT)
Age: 75
End: 2025-09-03

## 2025-09-03 ENCOUNTER — TRANSCRIPTION ENCOUNTER (OUTPATIENT)
Age: 75
End: 2025-09-03

## 2025-09-03 ENCOUNTER — OUTPATIENT (OUTPATIENT)
Dept: OUTPATIENT SERVICES | Facility: HOSPITAL | Age: 75
LOS: 1 days | End: 2025-09-03
Payer: MEDICARE

## 2025-09-03 ENCOUNTER — APPOINTMENT (OUTPATIENT)
Dept: THORACIC SURGERY | Facility: HOSPITAL | Age: 75
End: 2025-09-03

## 2025-09-03 VITALS
TEMPERATURE: 97 F | HEART RATE: 81 BPM | WEIGHT: 156.97 LBS | DIASTOLIC BLOOD PRESSURE: 76 MMHG | SYSTOLIC BLOOD PRESSURE: 136 MMHG | HEIGHT: 70 IN | OXYGEN SATURATION: 100 % | RESPIRATION RATE: 16 BRPM

## 2025-09-03 VITALS
HEART RATE: 80 BPM | RESPIRATION RATE: 19 BRPM | SYSTOLIC BLOOD PRESSURE: 152 MMHG | OXYGEN SATURATION: 99 % | DIASTOLIC BLOOD PRESSURE: 76 MMHG | TEMPERATURE: 98 F

## 2025-09-03 DIAGNOSIS — R91.1 SOLITARY PULMONARY NODULE: ICD-10-CM

## 2025-09-03 PROCEDURE — 71250 CT THORAX DX C-: CPT | Mod: 26

## 2025-09-03 PROCEDURE — 31627 NAVIGATIONAL BRONCHOSCOPY: CPT

## 2025-09-03 PROCEDURE — 31629 BRONCHOSCOPY/NEEDLE BX EACH: CPT

## 2025-09-03 PROCEDURE — 88112 CYTOPATH CELL ENHANCE TECH: CPT

## 2025-09-03 PROCEDURE — 88112 CYTOPATH CELL ENHANCE TECH: CPT | Mod: 26,59

## 2025-09-03 PROCEDURE — 88342 IMHCHEM/IMCYTCHM 1ST ANTB: CPT | Mod: 26

## 2025-09-03 PROCEDURE — 31624 DX BRONCHOSCOPE/LAVAGE: CPT

## 2025-09-03 PROCEDURE — 31628 BRONCHOSCOPY/LUNG BX EACH: CPT

## 2025-09-03 PROCEDURE — S2900: CPT

## 2025-09-03 PROCEDURE — 31625 BRONCHOSCOPY W/BIOPSY(S): CPT

## 2025-09-03 PROCEDURE — 31654 BRONCH EBUS IVNTJ PERPH LES: CPT

## 2025-09-03 PROCEDURE — 88172 CYTP DX EVAL FNA 1ST EA SITE: CPT

## 2025-09-03 PROCEDURE — 88305 TISSUE EXAM BY PATHOLOGIST: CPT

## 2025-09-03 PROCEDURE — 31623 DX BRONCHOSCOPE/BRUSH: CPT

## 2025-09-03 PROCEDURE — 88172 CYTP DX EVAL FNA 1ST EA SITE: CPT | Mod: 26

## 2025-09-03 PROCEDURE — 76000 FLUOROSCOPY <1 HR PHYS/QHP: CPT

## 2025-09-03 PROCEDURE — 88173 CYTOPATH EVAL FNA REPORT: CPT | Mod: 26

## 2025-09-03 PROCEDURE — 71045 X-RAY EXAM CHEST 1 VIEW: CPT | Mod: 26

## 2025-09-03 PROCEDURE — 71045 X-RAY EXAM CHEST 1 VIEW: CPT

## 2025-09-03 PROCEDURE — 71250 CT THORAX DX C-: CPT

## 2025-09-03 PROCEDURE — 88342 IMHCHEM/IMCYTCHM 1ST ANTB: CPT

## 2025-09-03 PROCEDURE — C2618: CPT

## 2025-09-03 PROCEDURE — 88173 CYTOPATH EVAL FNA REPORT: CPT

## 2025-09-03 PROCEDURE — 88305 TISSUE EXAM BY PATHOLOGIST: CPT | Mod: 26

## 2025-09-03 DEVICE — PROBE CRYO FLEX 1.1X1150 MM SNGL USE: Type: IMPLANTABLE DEVICE | Status: FUNCTIONAL

## 2025-09-05 LAB — NON-GYNECOLOGICAL CYTOLOGY STUDY: SIGNIFICANT CHANGE UP

## 2025-09-16 PROBLEM — C34.92 ADENOCARCINOMA OF LEFT LUNG: Status: ACTIVE | Noted: 2025-09-16

## 2025-09-19 ENCOUNTER — APPOINTMENT (OUTPATIENT)
Dept: ULTRASOUND IMAGING | Facility: CLINIC | Age: 75
End: 2025-09-19
Payer: MEDICARE

## 2025-09-19 ENCOUNTER — RESULT REVIEW (OUTPATIENT)
Age: 75
End: 2025-09-19

## 2025-09-19 ENCOUNTER — APPOINTMENT (OUTPATIENT)
Facility: CLINIC | Age: 75
End: 2025-09-19
Payer: MEDICARE

## 2025-09-19 VITALS
DIASTOLIC BLOOD PRESSURE: 62 MMHG | WEIGHT: 158 LBS | BODY MASS INDEX: 22.62 KG/M2 | HEIGHT: 70 IN | HEART RATE: 66 BPM | OXYGEN SATURATION: 99 % | SYSTOLIC BLOOD PRESSURE: 110 MMHG

## 2025-09-19 DIAGNOSIS — C34.92 MALIGNANT NEOPLASM OF UNSPECIFIED PART OF LEFT BRONCHUS OR LUNG: ICD-10-CM

## 2025-09-19 DIAGNOSIS — R91.1 SOLITARY PULMONARY NODULE: ICD-10-CM

## 2025-09-19 PROCEDURE — 76536 US EXAM OF HEAD AND NECK: CPT | Mod: 26

## 2025-09-19 PROCEDURE — 99214 OFFICE O/P EST MOD 30 MIN: CPT

## (undated) DEVICE — ION VISION PROBE BAG

## (undated) DEVICE — ION PERIPHERAL VISION PROBE

## (undated) DEVICE — SPECIMEN TRAP 70ML

## (undated) DEVICE — ION BIOPSY NEEDLE 23G

## (undated) DEVICE — SOL IRR POUR NS 0.9% 1000ML

## (undated) DEVICE — DRAPE XL SHEET 77X98"

## (undated) DEVICE — GOWN IMPERV XL

## (undated) DEVICE — PACK IV START WITH CHG

## (undated) DEVICE — TUBING CONNECTING 6MM 20FT

## (undated) DEVICE — BRUSH CYTO DISP

## (undated) DEVICE — DENTURE CUP PINK

## (undated) DEVICE — ION FULLY ARTICULATING CATHETER

## (undated) DEVICE — Device

## (undated) DEVICE — ION VISION PROBE ADAPTOR

## (undated) DEVICE — SYR LUER LOK 10CC

## (undated) DEVICE — SYR CONTROL LUER LOK 10CC

## (undated) DEVICE — ION CATHETER GUIDE

## (undated) DEVICE — DRAPE MAYO STAND 23"

## (undated) DEVICE — ION BIOPSY NEEDLE 19G

## (undated) DEVICE — SYR IV FLUSH SALINE 10ML 30/TY

## (undated) DEVICE — ION BIOPSY NEEDLE 21G

## (undated) DEVICE — CATH IV SAFE BC 22G X 1" (BLUE)

## (undated) DEVICE — DRAPE 3/4 SHEET 52X76"

## (undated) DEVICE — VISITEC 4X4

## (undated) DEVICE — TRAP SPECIMEN SPUTUM 40CC

## (undated) DEVICE — ION SWIVEL CONNECTOR

## (undated) DEVICE — WARMING BLANKET LOWER ADULT

## (undated) DEVICE — ION SENSOR CONNECTION CLEANER

## (undated) DEVICE — SOL IRR POUR H2O 1000ML

## (undated) DEVICE — BITE BLOCK ADULT 20 X 27MM (GREEN)

## (undated) DEVICE — VENODYNE/SCD SLEEVE CALF MEDIUM

## (undated) DEVICE — SSH-ERBE RM1 11351341: Type: DURABLE MEDICAL EQUIPMENT

## (undated) DEVICE — VALVE SUCTION EVIS 160/200/240

## (undated) DEVICE — VALVE BIOPSY BRONCHOVIDEOSCOPE